# Patient Record
Sex: MALE | Race: WHITE | URBAN - METROPOLITAN AREA
[De-identification: names, ages, dates, MRNs, and addresses within clinical notes are randomized per-mention and may not be internally consistent; named-entity substitution may affect disease eponyms.]

---

## 2019-04-12 ENCOUNTER — HOSPITAL ENCOUNTER (OUTPATIENT)
Dept: MEDSURG UNIT | Facility: HOSPITAL | Age: 29
End: 2019-04-13
Attending: SURGERY | Admitting: SURGERY

## 2022-04-07 ENCOUNTER — HISTORICAL (OUTPATIENT)
Dept: ADMINISTRATIVE | Facility: HOSPITAL | Age: 32
End: 2022-04-07

## 2022-04-24 VITALS
HEIGHT: 73 IN | DIASTOLIC BLOOD PRESSURE: 91 MMHG | SYSTOLIC BLOOD PRESSURE: 137 MMHG | BODY MASS INDEX: 31.06 KG/M2 | WEIGHT: 234.38 LBS

## 2022-05-02 NOTE — HISTORICAL [facility]
This is a historical note converted from [employer]. Formatting and pictures may have been removed.  Please reference [employer] for original formatting and attached multimedia. Chief Complaint  pt c/o RLQ pain x 2 days, reports fever of 102 last night. seen by MD this am and told to come to ER for possible appendicitis. ?reports n/v/d yesterday, only diarrhea today.  History of Present Illness  27 y/o man with PMHx of HTN associated with persistent microscopic hematuria who presents today due to 3 day history of RLQ pain associated with nausea, decreased PO intake, but no vomiting. He had an episode of this pain one month ago with resolution of symptoms. On exam in the ED, the patient is non-toxic appearing, afebrile, hemodynamically stable, WBC 12.7. CT revealed appendicitis with no perforation or abscess.  ?  PMHx: HTN  Surgical Hx: Right hip replacement  Social Hx: 1 pack lasts 2 weeks, 2-3 mixed drinks per night, no history of dependence or withdrawal, denies illicits  Meds: HCTZ-Lisinopril  Allergies: None  Review of Systems  As above.  Physical Exam  Vitals & Measurements  T:?37? ?C (Oral)? HR:?105(Peripheral)? RR:?18? BP:?150/88? SpO2:?98%? WT:?104.05?kg?  General: NAD, Non-toxic appearing  HEENT: NCAT  CV: Tachycardic  Pulm: Normal WOB, No SOB  Abd: Soft, nondistended, TTP in RLQ. +Rovsings sign  Ext: +Psoas and Obturator signs  ?  Labs:  WBC 12.7  H/H 15.3/42.4  BUN/Cr 16/1.1  K 3.2  ?  Radiology:  CT with distended appendix, no perforation or abscess  Assessment/Plan  27 y/o man with acute appendicitis  ?  -Vitals, labs, and imaging reviewed  -Hemodynamically stable, non-toxic appearing, patient with acute appendicitis  -Admit to surgery  -Lap Appy in the morning  -IV Zosyn  -mIVFs  -CLD tonight, NPO at midnight except for medications  -Restart home BP medication  -Will replete potassium  -CBC and BMP in AM  -Lovenox, Protonix, Ambulation  ?  Eusebio Alatorre MD  [employer] General Surgery, PGY-I   Problem List/Past Medical  History  Ongoing  Obesity  Historical  No qualifying data  Medications  Inpatient  hydrochlorothiazide-lisinopril 12.5 mg-10 mg oral tablet, 1 tab(s), Oral, Daily  IVF Normal Saline NS Infusion 1,000 mL, 1000 mL, IV  Lovenox 40 mg/0.4 mL subcutaneous solution, 40 mg= 0.4 mL, Subcutaneous, Daily  oxycodone 5 mg oral tablet, 5 mg= 1 tab(s), Oral, q4hr, PRN  oxycodone 5 mg oral tablet, 10 mg= 2 tab(s), Oral, q4hr, PRN  Protonix 40 mg Vial (IV Push), 40 mg= 1 EA, IV Slow, Daily  Tylenol 325 mg oral tablet, 650 mg= 2 tab(s), Oral, q4hr, PRN  Zosyn 3.375 gm (for IVPB), 3.375 gm, IV Piggyback, q6hr  Home  hydrochlorothiazide-lisinopril 12.5 mg-10 mg oral tablet, 1 tab(s), Oral, Daily  Allergies  No Known Medication Allergies  Social History  Tobacco  10 or more cigarettes (1/2 pack or more)/day in last 30 days, No, 04/12/2019      HO-III addendum  Patient seen and examined  28yM with abdominal pain for 3 days. Found to have acute appendicitis on CT. Hemodynamically stable  ?   Will admit. Start IV abx. OR in AM for lap appendectomy  ?  ?   Rudi Miller MD   I agree with resident documentation. I was physically present, supervised resident, ?and discussed plan of care.

## 2022-05-02 NOTE — HISTORICAL [facility]
This is a historical note converted from [employer]. Formatting and pictures may have been removed.  Please reference [facility] for original formatting and attached multimedia. Admit and Discharge Dates  Admit Date: 04/12/2019  Discharge Date: 04/13/2019  ?  Physicians  Attending Physician - Mavis CAO, Elle Garcia  Admitting Physician - Mavis CAO, Elle Garcia  Primary Care Physician - No PCP, No  ?  Discharge Diagnosis  Abdominal pain?[id number]  Acute appendicitis?K35.80  Flank pain?[id number]  Surgical Procedures  04/13/2019 - [id number] - Appendectomy Laparoscopic  ?  Immunizations  No immunizations recorded for this visit.  ?  Admission Information  29 y/o man with HTN who presented with RLQ and CT revealing acute appendicitis. He was admitted and received IV antibiotics and pain control. He was taken to the OR for a lap appy on 4/13 with no immediate complication. He quickly tolerated a regular diet with no nausea or vomiting, had PO pain control, and ambulated easily. He was discharged on 4/13/19 in stable condition with instructions to avoid heavy lifting for 4 weeks and to follow-up with us in clinic in 3 weeks.  Significant Findings  Appendicitis  Time Spent on discharge  Approximately 30 minutes  Objective  Vitals & Measurements  T:?36.8? ?C (Oral)? TMIN:?36.7? ?C (Oral)? TMAX:?38.3? ?C (Oral)? HR:?92(Peripheral)? HR:?88(Monitored)? RR:?20? BP:?145/74? SpO2:?97%? WT:?104.05?kg?  Physical Exam  NAD, NCAT  RR, Normal WOB, No SOB  Abd soft, nondistended, ATTP, incisions c/d/i  Patient Discharge Condition  Stable  Discharge Disposition  Home   Discharge Medication Reconciliation  Prescribed  oxyCODONE (oxycodone 5 mg oral tablet)?5 mg, Oral, q6hr, PRN for pain  Continue  hydrochlorothiazide-lisinopril (hydrochlorothiazide-lisinopril 12.5 mg-10 mg oral tablet)?1 tab(s), Oral, Daily  ?  Education and Orders Provided  Appendicitis  Laparoscopic Appendectomy,  Adult  Laparoscopic Appendectomy, Adult, Care After  Incision Care, Adult, Easy-to-Read  Discharge - 04/13/19 14:01:00 CDT, Home, Give all scheduled vaccinations prior to discharge.?  Discharge Activity - No Heavy Lifting, No lifting anything heavier than 10 pounds for 4 weeks. Okay for light duty at work.?  Discharge Diet - Regular?  Discharge Wound Care - 04/13/19 14:01:00 CDT, 1-2x/Day, Okay to shower. Clean surgical sites with soap and water. Keep skin glue on for 7-10 days. No baths?  ?  Follow up  Heron Aguirre, within 3 weeks, on  ????F/u Appy  ?      I agree with resident documentation. I was physically present, supervised resident, ?and discussed plan of care.

## 2022-05-02 NOTE — HISTORICAL [facility]
This is a historical note converted from [employer]. Formatting and pictures may have been removed.  Please reference [employer] for original formatting and attached multimedia. Indication for Surgery  27 y/o man with RLQ and CT revealing acute appendicitis  Preoperative Diagnosis  Acute appendicitis  Postoperative Diagnosis  Acute appendicitis  Operation  Laparoscopic Appendectomy  Surgeon(s)  MD Rudi Maradiaga MD Mandy Maness, MD  Assistant  Gregorio Grayson, MS3  Anesthesia  General  Estimated Blood Loss  15mL  Findings  Appendicitis  Specimen(s)  Appendix  Complications  None  Technique  The patient was taken to the operating?room and placed in the supine position. ?General endotracheal anesthesia was induced. ?A kong was placed and the abdomen was prepped and draped in the usual sterile fashion and a timeout was performed. ?A Veress needle was used to enter and insufflate the abdomen at [place]. A supraumbilical incision was made and an optical trocar was then used?to enter the abdomen.? Initial inspection revealed no injury to bowel or other abdominal contents. ?Another 5 mm port was placed in the suprapubic region and a 12 mm port was placed in the left lower quadrant.? The patient was then placed in Trendelenburg position and rotated to the left side.? The appendix was identified at the base of the cecum and noted to be inflamed?but not perforated.? This was then dissected off of the surrounding structures?and a window was created at the base of the appendix. ?A blue load of the stapler was then used to?separate the appendix from the cecum.??Three white loads of the stapler were then used to divide the mesoappendix.? The appendix was then removed via the 12 mm port site using an Endo Catch bag.? Final inspection revealed?no purulence or?active bleeding.? The abdomen was then desufflated and all ports removed. ?The 12 mm port site was closed at the level of fascia.? All skin incisions were closed using  4-0 Vicryl.? The patient was then extubated and transported to PACU. ?There were no complications.? All sponge and instrument count was correct at the end of the procedure. Dr. Gamble and present for all critical portions of the operation.   I agree with resident documentation. I was physically present, supervised resident, ?and discussed plan of care.